# Patient Record
Sex: FEMALE | Race: WHITE | Employment: FULL TIME | URBAN - METROPOLITAN AREA
[De-identification: names, ages, dates, MRNs, and addresses within clinical notes are randomized per-mention and may not be internally consistent; named-entity substitution may affect disease eponyms.]

---

## 2019-01-30 LAB
HEP B, EXTERNAL RESULT: NEGATIVE
RPR, EXTERNAL RESULT: NORMAL
RUBELLA TITER, EXTERNAL RESULT: NORMAL

## 2019-07-08 LAB
C. TRACHOMATIS, EXTERNAL RESULT: NEGATIVE
GBS, EXTERNAL RESULT: POSITIVE
N. GONORRHOEAE, EXTERNAL RESULT: NEGATIVE

## 2019-08-01 ENCOUNTER — ANESTHESIA EVENT (OUTPATIENT)
Dept: LABOR AND DELIVERY | Age: 34
End: 2019-08-01
Payer: COMMERCIAL

## 2019-08-01 ENCOUNTER — ANESTHESIA (OUTPATIENT)
Dept: LABOR AND DELIVERY | Age: 34
End: 2019-08-01
Payer: COMMERCIAL

## 2019-08-01 ENCOUNTER — HOSPITAL ENCOUNTER (INPATIENT)
Age: 34
LOS: 3 days | Discharge: HOME | End: 2019-08-04
Attending: OBSTETRICS & GYNECOLOGY | Admitting: OBSTETRICS & GYNECOLOGY
Payer: COMMERCIAL

## 2019-08-01 VITALS — SYSTOLIC BLOOD PRESSURE: 119 MMHG | OXYGEN SATURATION: 100 % | DIASTOLIC BLOOD PRESSURE: 68 MMHG

## 2019-08-01 PROBLEM — O09.299 HISTORY OF MACROSOMIA IN INFANT IN PRIOR PREGNANCY, CURRENTLY PREGNANT: Status: ACTIVE | Noted: 2019-08-01

## 2019-08-01 PROBLEM — Z98.891 PREVIOUS CESAREAN SECTION: Status: ACTIVE | Noted: 2019-08-01

## 2019-08-01 PROBLEM — Z34.90 SUPERVISION OF REPEAT TERM PREGNANCY: Status: ACTIVE | Noted: 2019-08-01

## 2019-08-01 LAB
ABO/RH: NORMAL
AMPHETAMINE SCREEN, URINE: NORMAL
ANTIBODY SCREEN: NORMAL
ANTIBODY SCREEN: NORMAL
BARBITURATE SCREEN URINE: NORMAL
BENZODIAZEPINE SCREEN, URINE: NORMAL
BUPRENORPHINE URINE: NORMAL
CANNABINOID SCREEN URINE: NORMAL
COCAINE METABOLITE SCREEN URINE: NORMAL
GLUCOSE BLD-MCNC: 93 MG/DL (ref 70–99)
HCT VFR BLD CALC: 38.4 % (ref 36–48)
HEMOGLOBIN: 12.8 G/DL (ref 12–16)
Lab: NORMAL
MCH RBC QN AUTO: 28.9 PG (ref 26–34)
MCHC RBC AUTO-ENTMCNC: 33.3 G/DL (ref 31–36)
MCV RBC AUTO: 86.7 FL (ref 80–100)
METHADONE SCREEN, URINE: NORMAL
OPIATE SCREEN URINE: NORMAL
OXYCODONE URINE: NORMAL
PDW BLD-RTO: 14.5 % (ref 12.4–15.4)
PH UA: 6
PHENCYCLIDINE SCREEN URINE: NORMAL
PLATELET # BLD: 165 K/UL (ref 135–450)
PMV BLD AUTO: 9.9 FL (ref 5–10.5)
PROPOXYPHENE SCREEN: NORMAL
RBC # BLD: 4.43 M/UL (ref 4–5.2)
TOTAL SYPHILLIS IGG/IGM: NORMAL
WBC # BLD: 9.1 K/UL (ref 4–11)

## 2019-08-01 PROCEDURE — 2580000003 HC RX 258: Performed by: OBSTETRICS & GYNECOLOGY

## 2019-08-01 PROCEDURE — 2709999900 HC NON-CHARGEABLE SUPPLY: Performed by: OBSTETRICS & GYNECOLOGY

## 2019-08-01 PROCEDURE — 6360000002 HC RX W HCPCS: Performed by: NURSE ANESTHETIST, CERTIFIED REGISTERED

## 2019-08-01 PROCEDURE — 85027 COMPLETE CBC AUTOMATED: CPT

## 2019-08-01 PROCEDURE — 7100000000 HC PACU RECOVERY - FIRST 15 MIN: Performed by: OBSTETRICS & GYNECOLOGY

## 2019-08-01 PROCEDURE — 3700000001 HC ADD 15 MINUTES (ANESTHESIA): Performed by: OBSTETRICS & GYNECOLOGY

## 2019-08-01 PROCEDURE — 6370000000 HC RX 637 (ALT 250 FOR IP): Performed by: OBSTETRICS & GYNECOLOGY

## 2019-08-01 PROCEDURE — 2500000003 HC RX 250 WO HCPCS: Performed by: OBSTETRICS & GYNECOLOGY

## 2019-08-01 PROCEDURE — 2500000003 HC RX 250 WO HCPCS: Performed by: NURSE ANESTHETIST, CERTIFIED REGISTERED

## 2019-08-01 PROCEDURE — 6360000002 HC RX W HCPCS

## 2019-08-01 PROCEDURE — 1200000000 HC SEMI PRIVATE

## 2019-08-01 PROCEDURE — 80307 DRUG TEST PRSMV CHEM ANLYZR: CPT

## 2019-08-01 PROCEDURE — 86780 TREPONEMA PALLIDUM: CPT

## 2019-08-01 PROCEDURE — 82947 ASSAY GLUCOSE BLOOD QUANT: CPT

## 2019-08-01 PROCEDURE — 6360000002 HC RX W HCPCS: Performed by: OBSTETRICS & GYNECOLOGY

## 2019-08-01 PROCEDURE — 2580000003 HC RX 258

## 2019-08-01 PROCEDURE — 3700000000 HC ANESTHESIA ATTENDED CARE: Performed by: OBSTETRICS & GYNECOLOGY

## 2019-08-01 PROCEDURE — 86850 RBC ANTIBODY SCREEN: CPT

## 2019-08-01 PROCEDURE — 86900 BLOOD TYPING SEROLOGIC ABO: CPT

## 2019-08-01 PROCEDURE — 2580000003 HC RX 258: Performed by: NURSE ANESTHETIST, CERTIFIED REGISTERED

## 2019-08-01 PROCEDURE — 7100000001 HC PACU RECOVERY - ADDTL 15 MIN: Performed by: OBSTETRICS & GYNECOLOGY

## 2019-08-01 PROCEDURE — 3609079900 HC CESAREAN SECTION: Performed by: OBSTETRICS & GYNECOLOGY

## 2019-08-01 PROCEDURE — 86901 BLOOD TYPING SEROLOGIC RH(D): CPT

## 2019-08-01 RX ORDER — NALOXONE HYDROCHLORIDE 0.4 MG/ML
0.4 INJECTION, SOLUTION INTRAMUSCULAR; INTRAVENOUS; SUBCUTANEOUS PRN
Status: DISCONTINUED | OUTPATIENT
Start: 2019-08-01 | End: 2019-08-04 | Stop reason: HOSPADM

## 2019-08-01 RX ORDER — METHYLERGONOVINE MALEATE 0.2 MG/ML
INJECTION INTRAVENOUS
Status: DISCONTINUED
Start: 2019-08-01 | End: 2019-08-01 | Stop reason: WASHOUT

## 2019-08-01 RX ORDER — SODIUM CHLORIDE 9 MG/ML
INJECTION, SOLUTION INTRAVENOUS CONTINUOUS PRN
Status: DISCONTINUED | OUTPATIENT
Start: 2019-08-01 | End: 2019-08-01 | Stop reason: SDUPTHER

## 2019-08-01 RX ORDER — DEXAMETHASONE SODIUM PHOSPHATE 4 MG/ML
INJECTION, SOLUTION INTRA-ARTICULAR; INTRALESIONAL; INTRAMUSCULAR; INTRAVENOUS; SOFT TISSUE PRN
Status: DISCONTINUED | OUTPATIENT
Start: 2019-08-01 | End: 2019-08-01 | Stop reason: SDUPTHER

## 2019-08-01 RX ORDER — ACETAMINOPHEN 500 MG
1000 TABLET ORAL EVERY 8 HOURS
Status: DISCONTINUED | OUTPATIENT
Start: 2019-08-02 | End: 2019-08-04 | Stop reason: HOSPADM

## 2019-08-01 RX ORDER — ONDANSETRON 2 MG/ML
INJECTION INTRAMUSCULAR; INTRAVENOUS PRN
Status: DISCONTINUED | OUTPATIENT
Start: 2019-08-01 | End: 2019-08-01 | Stop reason: SDUPTHER

## 2019-08-01 RX ORDER — SODIUM CHLORIDE, SODIUM LACTATE, POTASSIUM CHLORIDE, CALCIUM CHLORIDE 600; 310; 30; 20 MG/100ML; MG/100ML; MG/100ML; MG/100ML
INJECTION, SOLUTION INTRAVENOUS CONTINUOUS
Status: DISCONTINUED | OUTPATIENT
Start: 2019-08-01 | End: 2019-08-04 | Stop reason: HOSPADM

## 2019-08-01 RX ORDER — OXYCODONE HYDROCHLORIDE 5 MG/1
5 TABLET ORAL EVERY 4 HOURS PRN
Status: DISCONTINUED | OUTPATIENT
Start: 2019-08-01 | End: 2019-08-04 | Stop reason: HOSPADM

## 2019-08-01 RX ORDER — BUPIVACAINE HYDROCHLORIDE 7.5 MG/ML
INJECTION, SOLUTION INTRASPINAL PRN
Status: DISCONTINUED | OUTPATIENT
Start: 2019-08-01 | End: 2019-08-01 | Stop reason: SDUPTHER

## 2019-08-01 RX ORDER — DOCUSATE SODIUM 100 MG/1
100 CAPSULE, LIQUID FILLED ORAL 2 TIMES DAILY
Status: DISCONTINUED | OUTPATIENT
Start: 2019-08-01 | End: 2019-08-04 | Stop reason: HOSPADM

## 2019-08-01 RX ORDER — METHYLERGONOVINE MALEATE 0.2 MG/1
200 TABLET ORAL 2 TIMES DAILY
Status: DISCONTINUED | OUTPATIENT
Start: 2019-08-01 | End: 2019-08-04 | Stop reason: HOSPADM

## 2019-08-01 RX ORDER — GLYBURIDE 2.5 MG/1
2.5 TABLET ORAL NIGHTLY
COMMUNITY

## 2019-08-01 RX ORDER — SODIUM CHLORIDE 0.9 % (FLUSH) 0.9 %
10 SYRINGE (ML) INJECTION PRN
Status: DISCONTINUED | OUTPATIENT
Start: 2019-08-01 | End: 2019-08-04 | Stop reason: HOSPADM

## 2019-08-01 RX ORDER — PHENYLEPHRINE HYDROCHLORIDE 10 MG/ML
INJECTION INTRAVENOUS PRN
Status: DISCONTINUED | OUTPATIENT
Start: 2019-08-01 | End: 2019-08-01 | Stop reason: SDUPTHER

## 2019-08-01 RX ORDER — OXYCODONE HYDROCHLORIDE 5 MG/1
10 TABLET ORAL EVERY 4 HOURS PRN
Status: DISCONTINUED | OUTPATIENT
Start: 2019-08-01 | End: 2019-08-04 | Stop reason: HOSPADM

## 2019-08-01 RX ORDER — SODIUM CHLORIDE 9 MG/ML
INJECTION, SOLUTION INTRAVENOUS
Status: COMPLETED
Start: 2019-08-01 | End: 2019-08-01

## 2019-08-01 RX ORDER — LEVOTHYROXINE SODIUM 0.12 MG/1
125 TABLET ORAL DAILY
COMMUNITY

## 2019-08-01 RX ORDER — SODIUM CHLORIDE 9 MG/ML
INJECTION, SOLUTION INTRAVENOUS CONTINUOUS
Status: DISCONTINUED | OUTPATIENT
Start: 2019-08-01 | End: 2019-08-01

## 2019-08-01 RX ORDER — METOCLOPRAMIDE HYDROCHLORIDE 5 MG/ML
10 INJECTION INTRAMUSCULAR; INTRAVENOUS ONCE
Status: COMPLETED | OUTPATIENT
Start: 2019-08-01 | End: 2019-08-01

## 2019-08-01 RX ORDER — GLYBURIDE 5 MG/1
2.5 TABLET ORAL NIGHTLY
Status: DISCONTINUED | OUTPATIENT
Start: 2019-08-01 | End: 2019-08-04 | Stop reason: HOSPADM

## 2019-08-01 RX ORDER — KETOROLAC TROMETHAMINE 30 MG/ML
INJECTION, SOLUTION INTRAMUSCULAR; INTRAVENOUS PRN
Status: DISCONTINUED | OUTPATIENT
Start: 2019-08-01 | End: 2019-08-01 | Stop reason: SDUPTHER

## 2019-08-01 RX ORDER — SODIUM CHLORIDE 0.9 % (FLUSH) 0.9 %
10 SYRINGE (ML) INJECTION EVERY 12 HOURS SCHEDULED
Status: DISCONTINUED | OUTPATIENT
Start: 2019-08-01 | End: 2019-08-04 | Stop reason: HOSPADM

## 2019-08-01 RX ORDER — MORPHINE SULFATE 1 MG/ML
INJECTION, SOLUTION EPIDURAL; INTRATHECAL; INTRAVENOUS PRN
Status: DISCONTINUED | OUTPATIENT
Start: 2019-08-01 | End: 2019-08-01 | Stop reason: SDUPTHER

## 2019-08-01 RX ORDER — DIPHENHYDRAMINE HYDROCHLORIDE 50 MG/ML
INJECTION INTRAMUSCULAR; INTRAVENOUS PRN
Status: DISCONTINUED | OUTPATIENT
Start: 2019-08-01 | End: 2019-08-01 | Stop reason: SDUPTHER

## 2019-08-01 RX ORDER — ACETAMINOPHEN 500 MG
1000 TABLET ORAL EVERY 8 HOURS SCHEDULED
Status: DISCONTINUED | OUTPATIENT
Start: 2019-08-01 | End: 2019-08-01

## 2019-08-01 RX ORDER — METHYLERGONOVINE MALEATE 0.2 MG/ML
200 INJECTION INTRAVENOUS PRN
Status: DISCONTINUED | OUTPATIENT
Start: 2019-08-01 | End: 2019-08-04 | Stop reason: HOSPADM

## 2019-08-01 RX ORDER — TRISODIUM CITRATE DIHYDRATE AND CITRIC ACID MONOHYDRATE 500; 334 MG/5ML; MG/5ML
30 SOLUTION ORAL ONCE
Status: DISCONTINUED | OUTPATIENT
Start: 2019-08-01 | End: 2019-08-01

## 2019-08-01 RX ORDER — KETOROLAC TROMETHAMINE 30 MG/ML
30 INJECTION, SOLUTION INTRAMUSCULAR; INTRAVENOUS EVERY 8 HOURS
Status: DISCONTINUED | OUTPATIENT
Start: 2019-08-01 | End: 2019-08-03

## 2019-08-01 RX ORDER — NALBUPHINE HCL 10 MG/ML
10 AMPUL (ML) INJECTION EVERY 4 HOURS PRN
Status: DISCONTINUED | OUTPATIENT
Start: 2019-08-01 | End: 2019-08-04 | Stop reason: HOSPADM

## 2019-08-01 RX ORDER — SODIUM CHLORIDE 0.9 % (FLUSH) 0.9 %
10 SYRINGE (ML) INJECTION PRN
Status: DISCONTINUED | OUTPATIENT
Start: 2019-08-01 | End: 2019-08-01

## 2019-08-01 RX ORDER — TRISODIUM CITRATE DIHYDRATE AND CITRIC ACID MONOHYDRATE 500; 334 MG/5ML; MG/5ML
30 SOLUTION ORAL ONCE
Status: COMPLETED | OUTPATIENT
Start: 2019-08-01 | End: 2019-08-01

## 2019-08-01 RX ORDER — ACETAMINOPHEN 500 MG
1000 TABLET ORAL ONCE
Status: COMPLETED | OUTPATIENT
Start: 2019-08-01 | End: 2019-08-01

## 2019-08-01 RX ORDER — SODIUM CHLORIDE 0.9 % (FLUSH) 0.9 %
10 SYRINGE (ML) INJECTION EVERY 12 HOURS SCHEDULED
Status: DISCONTINUED | OUTPATIENT
Start: 2019-08-01 | End: 2019-08-01

## 2019-08-01 RX ORDER — IBUPROFEN 800 MG/1
800 TABLET ORAL EVERY 8 HOURS SCHEDULED
Status: DISCONTINUED | OUTPATIENT
Start: 2019-08-01 | End: 2019-08-04 | Stop reason: HOSPADM

## 2019-08-01 RX ORDER — LANOLIN 100 %
OINTMENT (GRAM) TOPICAL
Status: DISCONTINUED | OUTPATIENT
Start: 2019-08-01 | End: 2019-08-04 | Stop reason: HOSPADM

## 2019-08-01 RX ORDER — CEFAZOLIN SODIUM 2 G/100ML
2 INJECTION, SOLUTION INTRAVENOUS ONCE
Status: COMPLETED | OUTPATIENT
Start: 2019-08-01 | End: 2019-08-01

## 2019-08-01 RX ORDER — OXYTOCIN 10 [USP'U]/ML
INJECTION, SOLUTION INTRAMUSCULAR; INTRAVENOUS PRN
Status: DISCONTINUED | OUTPATIENT
Start: 2019-08-01 | End: 2019-08-01 | Stop reason: SDUPTHER

## 2019-08-01 RX ADMIN — SODIUM CHLORIDE: 9 INJECTION, SOLUTION INTRAVENOUS at 08:15

## 2019-08-01 RX ADMIN — METHYLERGONOVINE MALEATE 200 MCG: 0.2 TABLET ORAL at 20:57

## 2019-08-01 RX ADMIN — SODIUM CHLORIDE: 9 INJECTION, SOLUTION INTRAVENOUS at 09:00

## 2019-08-01 RX ADMIN — PHENYLEPHRINE HYDROCHLORIDE 200 MCG: 10 INJECTION INTRAVENOUS at 08:35

## 2019-08-01 RX ADMIN — MORPHINE SULFATE 0.2 MG: 1 INJECTION EPIDURAL; INTRATHECAL; INTRAVENOUS at 08:21

## 2019-08-01 RX ADMIN — METHYLERGONOVINE MALEATE 200 MCG: 0.2 INJECTION INTRAVENOUS at 14:09

## 2019-08-01 RX ADMIN — METOCLOPRAMIDE 10 MG: 5 INJECTION, SOLUTION INTRAMUSCULAR; INTRAVENOUS at 06:49

## 2019-08-01 RX ADMIN — OXYTOCIN 20 UNITS: 10 INJECTION, SOLUTION INTRAMUSCULAR; INTRAVENOUS at 08:48

## 2019-08-01 RX ADMIN — ACETAMINOPHEN 1000 MG: 500 TABLET, FILM COATED ORAL at 06:49

## 2019-08-01 RX ADMIN — SODIUM CHLORIDE 1000 ML: 9 INJECTION, SOLUTION INTRAVENOUS at 06:30

## 2019-08-01 RX ADMIN — BUPIVACAINE HYDROCHLORIDE IN DEXTROSE 2 ML: 7.5 INJECTION, SOLUTION SUBARACHNOID at 08:21

## 2019-08-01 RX ADMIN — KETOROLAC TROMETHAMINE 30 MG: 30 INJECTION, SOLUTION INTRAMUSCULAR at 17:28

## 2019-08-01 RX ADMIN — ACETAMINOPHEN 1000 MG: 500 TABLET, FILM COATED ORAL at 16:52

## 2019-08-01 RX ADMIN — OXYTOCIN 20 UNITS: 10 INJECTION, SOLUTION INTRAMUSCULAR; INTRAVENOUS at 09:00

## 2019-08-01 RX ADMIN — OXYTOCIN: 10 INJECTION INTRAVENOUS at 15:18

## 2019-08-01 RX ADMIN — CEFAZOLIN SODIUM 2 G: 2 INJECTION, SOLUTION INTRAVENOUS at 08:09

## 2019-08-01 RX ADMIN — ONDANSETRON 4 MG: 2 INJECTION INTRAMUSCULAR; INTRAVENOUS at 08:15

## 2019-08-01 RX ADMIN — SODIUM CITRATE AND CITRIC ACID MONOHYDRATE 30 ML: 500; 334 SOLUTION ORAL at 06:49

## 2019-08-01 RX ADMIN — OXYTOCIN: 10 INJECTION INTRAVENOUS at 22:58

## 2019-08-01 RX ADMIN — DIPHENHYDRAMINE HYDROCHLORIDE 50 MG: 50 INJECTION, SOLUTION INTRAMUSCULAR; INTRAVENOUS at 08:51

## 2019-08-01 RX ADMIN — DOCUSATE SODIUM 100 MG: 100 CAPSULE, LIQUID FILLED ORAL at 20:57

## 2019-08-01 RX ADMIN — MORPHINE SULFATE 3.8 MG: 1 INJECTION EPIDURAL; INTRATHECAL; INTRAVENOUS at 08:51

## 2019-08-01 RX ADMIN — KETOROLAC TROMETHAMINE 30 MG: 30 INJECTION, SOLUTION INTRAMUSCULAR; INTRAVENOUS at 08:51

## 2019-08-01 RX ADMIN — SODIUM CHLORIDE, POTASSIUM CHLORIDE, SODIUM LACTATE AND CALCIUM CHLORIDE: 600; 310; 30; 20 INJECTION, SOLUTION INTRAVENOUS at 14:01

## 2019-08-01 RX ADMIN — DEXAMETHASONE SODIUM PHOSPHATE 8 MG: 4 INJECTION, SOLUTION INTRAMUSCULAR; INTRAVENOUS at 08:51

## 2019-08-01 RX ADMIN — SODIUM CHLORIDE: 9 INJECTION, SOLUTION INTRAVENOUS at 06:54

## 2019-08-01 RX ADMIN — SODIUM CHLORIDE: 9 INJECTION, SOLUTION INTRAVENOUS at 08:48

## 2019-08-01 RX ADMIN — FAMOTIDINE 20 MG: 10 INJECTION, SOLUTION INTRAVENOUS at 06:49

## 2019-08-01 SDOH — HEALTH STABILITY: MENTAL HEALTH: HOW OFTEN DO YOU HAVE A DRINK CONTAINING ALCOHOL?: NEVER

## 2019-08-01 ASSESSMENT — PULMONARY FUNCTION TESTS
PIF_VALUE: 0

## 2019-08-01 ASSESSMENT — PAIN SCALES - GENERAL
PAINLEVEL_OUTOF10: 0
PAINLEVEL_OUTOF10: 1
PAINLEVEL_OUTOF10: 1

## 2019-08-01 NOTE — L&D DELIVERY NOTE
Method:  Bulb Suction, Stimulation     Apgars    Living status:  Living  Apgars   1 Minute:   5 Minute:   10 Minute 15 Minute 20 Minute   Skin Color: 0  1       Heart Rate: 2  2       Reflex Irritability: 2  2       Muscle Tone: 2  2       Respiratory Effort: 2  2       Total: 8  9               Apgars Assigned By:  Zee Dow. Antoine Damon RN     Skin to Skin    Skin to skin initiation date/time:     Skin to skin end date/time:     Reason skin to skin not initiated:   Maternal Acuity     [place] Measurements    Weight:  4560 g Length:  55.5 cm   Head circumference:  38.5 cm    ID band #:  [id number] [id number] Security tag #:  [id number]      Delivery Information    Perineal lacerations:  None    Vaginal laceration:  No    Cervical laceration:  No    Surgical or additional est. blood loss (mL):  0 (View Only):  Edit in Flowsheets   Combined est. blood loss (mL):  0     Other Procedures    Procedures:  None     Labor Length    3rd stage:  0h 02m        See operative report

## 2019-08-01 NOTE — ANESTHESIA PRE PROCEDURE
Counseling given: Not Answered      Vital Signs (Current):   Vitals:    08/01/19 0658 08/01/19 0724   BP: 127/77 132/82   Pulse: 84 88   Resp: 20 18   Temp: 36.5 °C (97.7 °F)    TempSrc: Oral    Weight: 252 lb (114.3 kg)    Height: 5' 1.5\" (1.562 m)                                               BP Readings from Last 3 Encounters:   08/01/19 132/82       NPO Status: Time of last liquid consumption: 2130                        Time of last solid consumption: 2130                        Date of last liquid consumption: 07/31/19                        Date of last solid food consumption: 07/31/19    BMI:   Wt Readings from Last 3 Encounters:   08/01/19 252 lb (114.3 kg)     Body mass index is 46.84 kg/m². CBC:   Lab Results   Component Value Date    WBC 9.1 08/01/2019    RBC 4.43 08/01/2019    HGB 12.8 08/01/2019    HCT 38.4 08/01/2019    MCV 86.7 08/01/2019    RDW 14.5 08/01/2019     08/01/2019       CMP:   Lab Results   Component Value Date    GLUCOSE 93 08/01/2019       POC Tests: No results for input(s): POCGLU, POCNA, POCK, POCCL, POCBUN, POCHEMO, POCHCT in the last 72 hours.     Coags: No results found for: PROTIME, INR, APTT    HCG (If Applicable): No results found for: PREGTESTUR, PREGSERUM, HCG, HCGQUANT     ABGs: No results found for: PHART, PO2ART, HGT2VFX, IZC0IYE, BEART, C0LPJAZS     Type & Screen (If Applicable):  No results found for: LABABO, [address]    Anesthesia Evaluation  Patient summary reviewed and Nursing notes reviewed  Airway: Mallampati: III  TM distance: >3 FB   Neck ROM: full  Mouth opening: > = 3 FB Dental: normal exam         Pulmonary:normal exam  breath sounds clear to auscultation  (+) pneumonia:                             Cardiovascular:Negative CV ROS  Exercise tolerance: good (>4 METS),         NYHA Classification: I    Rhythm: regular  Rate: normal           Beta Blocker:  Not on Beta Blocker         Neuro/Psych:   Negative Neuro/Psych ROS

## 2019-08-01 NOTE — FLOWSHEET NOTE
Patient transferred to postpartum via bed and settled into postpartum room. Pt oriented to folder and postpartum care. Oriented to call light, phone and ordering meals. Postpartum RN's name and phone number posted for pt. Siderails up x2. Pt oriented to equipment. Report given to Antonina Collier RN. Pt included in discussion and all questions answered.

## 2019-08-01 NOTE — PROGRESS NOTES
Pt up to chair with assist x2, tolerated well. Call light within reach, instructed to call for assistance back to bed, will continue to monitor.  at bedside.

## 2019-08-02 LAB
HCT VFR BLD CALC: 30.1 % (ref 36–48)
HEMOGLOBIN: 10.2 G/DL (ref 12–16)
MCH RBC QN AUTO: 29.2 PG (ref 26–34)
MCHC RBC AUTO-ENTMCNC: 33.9 G/DL (ref 31–36)
MCV RBC AUTO: 86 FL (ref 80–100)
PDW BLD-RTO: 14.3 % (ref 12.4–15.4)
PLATELET # BLD: 143 K/UL (ref 135–450)
PMV BLD AUTO: 9.3 FL (ref 5–10.5)
RBC # BLD: 3.49 M/UL (ref 4–5.2)
WBC # BLD: 8.9 K/UL (ref 4–11)

## 2019-08-02 PROCEDURE — 1200000000 HC SEMI PRIVATE

## 2019-08-02 PROCEDURE — 85027 COMPLETE CBC AUTOMATED: CPT

## 2019-08-02 PROCEDURE — 6370000000 HC RX 637 (ALT 250 FOR IP): Performed by: OBSTETRICS & GYNECOLOGY

## 2019-08-02 PROCEDURE — 6360000002 HC RX W HCPCS: Performed by: OBSTETRICS & GYNECOLOGY

## 2019-08-02 RX ADMIN — KETOROLAC TROMETHAMINE 30 MG: 30 INJECTION, SOLUTION INTRAMUSCULAR at 01:55

## 2019-08-02 RX ADMIN — LEVOTHYROXINE SODIUM 125 MCG: 25 TABLET ORAL at 06:08

## 2019-08-02 RX ADMIN — ACETAMINOPHEN 1000 MG: 500 TABLET, FILM COATED ORAL at 08:54

## 2019-08-02 RX ADMIN — ACETAMINOPHEN 1000 MG: 500 TABLET, FILM COATED ORAL at 18:11

## 2019-08-02 RX ADMIN — IBUPROFEN 800 MG: 800 TABLET, FILM COATED ORAL at 10:47

## 2019-08-02 RX ADMIN — IBUPROFEN 800 MG: 800 TABLET, FILM COATED ORAL at 19:39

## 2019-08-02 RX ADMIN — ACETAMINOPHEN 1000 MG: 500 TABLET, FILM COATED ORAL at 01:00

## 2019-08-02 RX ADMIN — METHYLERGONOVINE MALEATE 200 MCG: 0.2 TABLET ORAL at 08:54

## 2019-08-02 RX ADMIN — DOCUSATE SODIUM 100 MG: 100 CAPSULE, LIQUID FILLED ORAL at 08:54

## 2019-08-02 ASSESSMENT — PAIN SCALES - GENERAL
PAINLEVEL_OUTOF10: 2
PAINLEVEL_OUTOF10: 3
PAINLEVEL_OUTOF10: 2
PAINLEVEL_OUTOF10: 1

## 2019-08-02 NOTE — PROGRESS NOTES
Subjective:     Postpartum Day 1:  Delivery    The patient feels well. The patient denies emotional concerns. Pain is well controlled with current medications. The baby iswell. Baby is feeding via breast. Urinary output is adequate. The patient is ambulating well. The patient is tolerating a normal diet. Flatus has been passed. Objective:      Patient Vitals for the past 8 hrs:   BP Temp Temp src Pulse Resp SpO2   19 0530 101/68 98.3 °F (36.8 °C) Oral 82 18 98 %     General:    alert, appears stated age and cooperative   Bowel Sounds:  active   Lochia:  appropriate   Uterine Fundus:   firm   Incision:  healing well, no significant drainage, no dehiscence, no significant erythema, minimal discharge on Telfa is stable   DVT Evaluation:  No evidence of DVT seen on physical exam.     Assessment:     Status post  section. Doing well postoperatively. Plan:     Continue current care.

## 2019-08-03 PROCEDURE — 6370000000 HC RX 637 (ALT 250 FOR IP): Performed by: OBSTETRICS & GYNECOLOGY

## 2019-08-03 PROCEDURE — 1200000000 HC SEMI PRIVATE

## 2019-08-03 RX ADMIN — ACETAMINOPHEN 1000 MG: 500 TABLET, FILM COATED ORAL at 18:23

## 2019-08-03 RX ADMIN — ACETAMINOPHEN 1000 MG: 500 TABLET, FILM COATED ORAL at 02:07

## 2019-08-03 RX ADMIN — IBUPROFEN 800 MG: 800 TABLET, FILM COATED ORAL at 11:41

## 2019-08-03 RX ADMIN — GLYBURIDE 2.5 MG: 5 TABLET ORAL at 02:06

## 2019-08-03 RX ADMIN — DOCUSATE SODIUM 100 MG: 100 CAPSULE, LIQUID FILLED ORAL at 02:06

## 2019-08-03 RX ADMIN — IBUPROFEN 800 MG: 800 TABLET, FILM COATED ORAL at 20:04

## 2019-08-03 RX ADMIN — OXYCODONE HYDROCHLORIDE 5 MG: 5 TABLET ORAL at 03:27

## 2019-08-03 RX ADMIN — DOCUSATE SODIUM 100 MG: 100 CAPSULE, LIQUID FILLED ORAL at 08:32

## 2019-08-03 RX ADMIN — ACETAMINOPHEN 1000 MG: 500 TABLET, FILM COATED ORAL at 10:13

## 2019-08-03 RX ADMIN — OXYCODONE HYDROCHLORIDE 5 MG: 5 TABLET ORAL at 02:43

## 2019-08-03 RX ADMIN — OXYCODONE HYDROCHLORIDE 5 MG: 5 TABLET ORAL at 08:04

## 2019-08-03 RX ADMIN — LEVOTHYROXINE SODIUM 125 MCG: 25 TABLET ORAL at 08:05

## 2019-08-03 RX ADMIN — GLYBURIDE 2.5 MG: 5 TABLET ORAL at 21:03

## 2019-08-03 RX ADMIN — IBUPROFEN 800 MG: 800 TABLET, FILM COATED ORAL at 03:27

## 2019-08-03 RX ADMIN — DOCUSATE SODIUM 100 MG: 100 CAPSULE, LIQUID FILLED ORAL at 21:05

## 2019-08-03 RX ADMIN — OXYCODONE HYDROCHLORIDE 5 MG: 5 TABLET ORAL at 18:23

## 2019-08-03 RX ADMIN — METHYLERGONOVINE MALEATE 200 MCG: 0.2 TABLET ORAL at 02:11

## 2019-08-03 ASSESSMENT — PAIN SCALES - GENERAL
PAINLEVEL_OUTOF10: 1
PAINLEVEL_OUTOF10: 4
PAINLEVEL_OUTOF10: 4
PAINLEVEL_OUTOF10: 1
PAINLEVEL_OUTOF10: 1
PAINLEVEL_OUTOF10: 7
PAINLEVEL_OUTOF10: 3
PAINLEVEL_OUTOF10: 5

## 2019-08-03 ASSESSMENT — PAIN DESCRIPTION - DESCRIPTORS: DESCRIPTORS: DISCOMFORT

## 2019-08-04 VITALS
HEART RATE: 88 BPM | RESPIRATION RATE: 18 BRPM | BODY MASS INDEX: 46.38 KG/M2 | TEMPERATURE: 98 F | SYSTOLIC BLOOD PRESSURE: 130 MMHG | OXYGEN SATURATION: 97 % | WEIGHT: 252 LBS | HEIGHT: 62 IN | DIASTOLIC BLOOD PRESSURE: 72 MMHG

## 2019-08-04 PROBLEM — O09.299 HISTORY OF MACROSOMIA IN INFANT IN PRIOR PREGNANCY, CURRENTLY PREGNANT: Status: RESOLVED | Noted: 2019-08-01 | Resolved: 2019-08-04

## 2019-08-04 PROBLEM — Z34.90 SUPERVISION OF REPEAT TERM PREGNANCY: Status: RESOLVED | Noted: 2019-08-01 | Resolved: 2019-08-04

## 2019-08-04 PROBLEM — Z98.891 PREVIOUS CESAREAN SECTION: Status: RESOLVED | Noted: 2019-08-01 | Resolved: 2019-08-04

## 2019-08-04 PROCEDURE — 6370000000 HC RX 637 (ALT 250 FOR IP): Performed by: OBSTETRICS & GYNECOLOGY

## 2019-08-04 RX ADMIN — DOCUSATE SODIUM 100 MG: 100 CAPSULE, LIQUID FILLED ORAL at 09:46

## 2019-08-04 RX ADMIN — LEVOTHYROXINE SODIUM 125 MCG: 25 TABLET ORAL at 06:21

## 2019-08-04 RX ADMIN — IBUPROFEN 800 MG: 800 TABLET, FILM COATED ORAL at 11:41

## 2019-08-04 RX ADMIN — IBUPROFEN 800 MG: 800 TABLET, FILM COATED ORAL at 04:06

## 2019-08-04 RX ADMIN — ACETAMINOPHEN 1000 MG: 500 TABLET, FILM COATED ORAL at 02:24

## 2019-08-04 RX ADMIN — ACETAMINOPHEN 1000 MG: 500 TABLET, FILM COATED ORAL at 09:47

## 2019-08-04 ASSESSMENT — PAIN SCALES - GENERAL
PAINLEVEL_OUTOF10: 0
PAINLEVEL_OUTOF10: 3
PAINLEVEL_OUTOF10: 1
PAINLEVEL_OUTOF10: 0

## 2019-08-04 NOTE — FLOWSHEET NOTE
DR GRIGGS STATES PT IS TO CONTINUE GLYBURIDE AS PRIOR TO ADMISSION-PT INFORMED-VERBALIZED UNDERSTANDING OF PURPOSE,POTENTIAL SIDE EFFECTS AND DENIES QUESTIONS.

## 2019-08-04 NOTE — FLOWSHEET NOTE
Postpartum and infant care teaching completed and forms signed by patient. Copy witnessed by RN and given to patient. Patient verbalized understanding of all teaching points. HAS no prescriptios pt discussed with doctor this am and declined needing any. Pt plans to follow-up with [facility] Provider as instructed in 6 weeks-will call in AM for appt. Patient verbalizes understanding of discharge instructions and denies further questions. ID bands checked. Mother's ID band and one of baby's ID bands removed and taped to footprint sheet, signed by patient and witnessed by RN. Patient discharged in stable condition with -denies pain . Discharged in wheelchair, holding baby in arms.

## (undated) DEVICE — PENCIL SMK EVAC L10FT TBNG NONSTICK ESU BLDE PLUMEPEN ELITE

## (undated) DEVICE — PACK PROCEDURE SURG C SECT REV 1

## (undated) DEVICE — Z CONVERTED USE 2275207 CLOTH PREP W7.5XL7.5IN 2% CHG SKIN ALC AND RNS FREE

## (undated) DEVICE — SUTURE PLN GUT SZ 3-0 L27IN ABSRB YELLOWISH TAN L36MM CT-1 842H

## (undated) DEVICE — 9165 UNIVERSAL PATIENT PLATE: Brand: 3M™

## (undated) DEVICE — TRAY URIN CATH 16FR DRNGE BG STATLOK STBL DEV F SURSTP

## (undated) DEVICE — GARMENT,MEDLINE,DVT,INT,CALF,MED, GEN2: Brand: MEDLINE

## (undated) DEVICE — BLADE CLIPPER GEN PURP NS

## (undated) DEVICE — SUTURE MCRYL SZ 0 L36IN ABSRB VLT L48MM CTX 1/2 CIR Y398H

## (undated) DEVICE — 3M™ STERI-STRIP™ COMPOUND BENZOIN TINCTURE 40 BAGS/CARTON 4 CARTONS/CASE C1544: Brand: 3M™ STERI-STRIP™

## (undated) DEVICE — SUTURE VCRL SZ 4-0 L27IN ABSRB UD L60MM KS STR REV CUT NDL J662H

## (undated) DEVICE — 50" SINGLE PATIENT USE HOVERMATT: Brand: SINGLE PATIENT USE HOVERMATT

## (undated) DEVICE — SUTURE VCRL SZ 2-0 L36IN ABSRB UD L36MM CT-1 1/2 CIR J945H

## (undated) DEVICE — SUTURE VCRL SZ 3-0 L27IN ABSRB UD L36MM CT-1 1/2 CIR J258H

## (undated) DEVICE — CHLORAPREP 26ML ORANGE

## (undated) DEVICE — 3M™ STERI-STRIP™ REINFORCED ADHESIVE SKIN CLOSURES, R1549, 1/2 IN X 2 IN (12 MM X 50 MM), 6 STRIPS/ENVELOPE: Brand: 3M™ STERI-STRIP™

## (undated) DEVICE — SUTURE VCRL SZ 0 L36IN ABSRB UD L36MM CT-1 1/2 CIR J946H

## (undated) DEVICE — GLOVE SURG SZ 65 THK91MIL LTX FREE SYN POLYISOPRENE

## (undated) DEVICE — PAD DRESSING TELFA OUCHLESS NONADH 3X8IN

## (undated) DEVICE — WOUND RETRACTOR AND PROTECTOR: Brand: ALEXIS WOUND PROTECTOR-RETRACTOR